# Patient Record
Sex: MALE | Race: AMERICAN INDIAN OR ALASKA NATIVE | NOT HISPANIC OR LATINO | URBAN - METROPOLITAN AREA
[De-identification: names, ages, dates, MRNs, and addresses within clinical notes are randomized per-mention and may not be internally consistent; named-entity substitution may affect disease eponyms.]

---

## 2021-02-24 ENCOUNTER — EMERGENCY (EMERGENCY)
Facility: HOSPITAL | Age: 76
LOS: 1 days | Discharge: HOME | End: 2021-02-24
Attending: EMERGENCY MEDICINE | Admitting: EMERGENCY MEDICINE
Payer: MEDICARE

## 2021-02-24 VITALS
DIASTOLIC BLOOD PRESSURE: 70 MMHG | SYSTOLIC BLOOD PRESSURE: 113 MMHG | OXYGEN SATURATION: 97 % | HEART RATE: 81 BPM | RESPIRATION RATE: 16 BRPM

## 2021-02-24 VITALS
SYSTOLIC BLOOD PRESSURE: 114 MMHG | DIASTOLIC BLOOD PRESSURE: 69 MMHG | OXYGEN SATURATION: 100 % | HEART RATE: 81 BPM | RESPIRATION RATE: 18 BRPM

## 2021-02-24 PROCEDURE — 30903 CONTROL OF NOSEBLEED: CPT | Mod: RT

## 2021-02-24 PROCEDURE — 99284 EMERGENCY DEPT VISIT MOD MDM: CPT | Mod: 25

## 2021-02-24 RX ORDER — TRANEXAMIC ACID 100 MG/ML
5 INJECTION, SOLUTION INTRAVENOUS ONCE
Refills: 0 | Status: DISCONTINUED | OUTPATIENT
Start: 2021-02-24 | End: 2021-02-27

## 2021-02-24 NOTE — ED PROCEDURE NOTE - CPROC ED NASAL DETAIL1
The source of the bleeding was clearly identified./Topical thrombin sprayed onto the affected area./The anatomic location was packed./The bleeding stopped.

## 2021-02-24 NOTE — ED ADULT NURSE REASSESSMENT NOTE - NS ED NURSE REASSESS COMMENT FT1
Rhino rocket placed to the right nare by [employer] at this time. Pt daughter in law educated before procedure which she translated to the pt. Pt tolerated procedure well. No active bleeding at this time. VSS. Will continue to monitor

## 2021-02-24 NOTE — ED PROCEDURE NOTE - CPROC ED POST PROC CARE GUIDE1
Verbal/written post procedure instructions were given to patient/caregiver./Instructed patient/caregiver to follow-up with primary care physician./Instructed patient/caregiver regarding signs and symptoms of infection. [id number]

## 2021-02-24 NOTE — ED PROVIDER NOTE - CLINICAL SUMMARY MEDICAL DECISION MAKING FREE TEXT BOX
pt presenting with spontaneous epistaxis - will attempt to control bleeding via direct pressure and Scout-Synephrine.  If unsuccessful will need TXA and rhino rocket.  I am not concerned with a posterior bleed at this time.  Depending on amount of bleeding will consider getting a CBC to ensure no sig HgB drop.

## 2021-02-24 NOTE — ED ADULT NURSE NOTE - OBJECTIVE STATEMENT
76yo male received to room 3, pt Cantonese speaking and deaf. Pt daughter in law at the bedside able to provide translation to the patient via written Cantonese. Pt here today after x45 minutes of spontaneous, constant nosebleed from the right nare. +clots. Pt denies any facial trauma. Pt denies physical manipulation of the nare. Pt on aspirin. Denies sob, cp, headaches, dizziness, lightheadedness. Pt denies sensation of blood running down his throat. Pt daughter able to provide limited history. Pt advised to hold constant pressure. Comfort measures provided. Assessment ongoing.

## 2021-02-24 NOTE — ED ADULT NURSE NOTE - NSIMPLEMENTINTERV_GEN_ALL_ED
Implemented All Universal Safety Interventions:  [place] to call system. Call bell, personal items and telephone within reach. Instruct patient to call for assistance. Room bathroom lighting operational. Non-slip footwear when patient is off stretcher. Physically safe environment: no spills, clutter or unnecessary equipment. Stretcher in lowest position, wheels locked, appropriate side rails in place.

## 2021-02-24 NOTE — ED PROVIDER NOTE - PROGRESS NOTE DETAILS
bleeding not controlled with Afrin and direct pressure. Rhino rocket with TXA placed in R nostril. No bleeding noted. No recurrence of bleeding. Nasal packing in place. Instructions given to daughter and son. verbalize agreement and understanding. Follow up with ENT in 2 days for removal.

## 2021-02-24 NOTE — ED PROVIDER NOTE - PATIENT PORTAL LINK FT
You can access the FollowMyHealth Patient Portal offered by [facility] by registering at the following website: [email or web address]. By joining [facility]’s FollowMyHealth portal, you will also be able to view your health information using other applications (apps) compatible with our system.

## 2021-02-24 NOTE — ED PROVIDER NOTE - NSFOLLOWUPINSTRUCTIONS_ED_ALL_ED_FT
Follow up with ENT doctor to remove nasal packing within 2 days. If you are unable to get appointment, return to Emergency Department    DO NOT remove nasal packing  If you have a recurrence of bleeding return to Emergency Department     Follow up with your primary care physician in 48-72 hours- bring copies of your results.  Return to the ER for worsening or persistent symptoms, including but not limited to worsening/persistent pain, shortness of breath, fevers, vomiting, lightheadedness, passing out and/or ANY NEW OR CONCERNING SYMPTOMS. If you have issues obtaining follow up, please call: [phone] to obtain a doctor or specialist who takes your insurance in your area.  You may call [phone] to make an appointment with the internal medicine clinic.

## 2021-02-24 NOTE — ED PROVIDER NOTE - OBJECTIVE STATEMENT
74yo with history of _____ presenting with 45 minutes of a spontaneous nosebleed from the R nares.  Has been constant and severe.  No trauma.  No problems breathing and no history of the same.  Is on ASA daily.  No other AC.  Denies any SOB or lightheadedness.  Denies blood running down his throat.    History is limited and taken from patient by way of daughter in law (pt speaks Cantonese but is deaf so pacific  used to communicate with daughter in law who then would write in cantonese to patient) 76yo with history of htn, hld, dm, dementia presenting with 45 minutes of a spontaneous nosebleed from the R nares.  Has been constant and severe.  No trauma.  No problems breathing and no history of the same.  Is on ASA daily.  No other AC.  Denies any SOB or lightheadedness.  Denies blood running down his throat.    History is limited and taken from patient by way of daughter in law (pt speaks Cantonese but is deaf so pacific  used to communicate with daughter in law who then would write in cantonese to patient)

## 2021-02-24 NOTE — ED PROVIDER NOTE - CARE PROVIDER_API CALL
Zane Jean  OTOLARYNGOLOGY  [address] [place] [address], [address]  [place] [address]  Phone: [phone]  Fax: [phone]  Follow Up Time: 1-3 Days

## 2021-02-24 NOTE — ED ADULT NURSE NOTE - CAS EDN DISCHARGE ASSESSMENT
Chart reviewed.   Immunizations: Triggered Imm Registry     Orders placed: n/a  Upcoming appts to satisfy BHARATH topics: A1c 4/17/2020       Patient baseline mental status

## 2021-02-24 NOTE — ED PROVIDER NOTE - PHYSICAL EXAMINATION
Gen: Well appearing in NAD  Head: NC/AT  ENT:  Brisk R sided epistaxis no bleeding in the post oropharynx   Neck: trachea midline  Resp:  No distress  Ext: no deformities  Neuro:  A&O appears non focal  Skin:  Warm and dry as visualized  Psych:  Normal affect and mood

## 2021-02-24 NOTE — ED ADULT TRIAGE NOTE - CHIEF COMPLAINT QUOTE
pt brought in by EMS for epistaxis x45 min. pt deaf and Cantonese Speaking, daughter in law translating/communicating with patient. PMHx- HLD, DM, HTN, COVID-19 (Symptomatic since 1/27, positive test on 2/6, currently asymptomatic).

## 2025-04-01 NOTE — ED ADULT NURSE NOTE - MODE OF DISCHARGE
Orders:    Comprehensive metabolic panel; Future    CBC and differential; Future    TSH, 3rd generation with Free T4 reflex; Future    Lipid panel; Future     Ambulatory